# Patient Record
Sex: MALE | Race: WHITE | NOT HISPANIC OR LATINO | Employment: UNEMPLOYED | RURAL
[De-identification: names, ages, dates, MRNs, and addresses within clinical notes are randomized per-mention and may not be internally consistent; named-entity substitution may affect disease eponyms.]

---

## 2018-10-13 ENCOUNTER — OFFICE VISIT (OUTPATIENT)
Dept: RETAIL CLINIC | Facility: CLINIC | Age: 68
End: 2018-10-13

## 2018-10-13 VITALS
DIASTOLIC BLOOD PRESSURE: 84 MMHG | HEART RATE: 63 BPM | HEIGHT: 72 IN | OXYGEN SATURATION: 98 % | BODY MASS INDEX: 32.67 KG/M2 | SYSTOLIC BLOOD PRESSURE: 148 MMHG | RESPIRATION RATE: 18 BRPM | WEIGHT: 241.2 LBS | TEMPERATURE: 98.3 F

## 2018-10-13 DIAGNOSIS — M54.41 ACUTE RIGHT-SIDED LOW BACK PAIN WITH BILATERAL SCIATICA: Primary | ICD-10-CM

## 2018-10-13 DIAGNOSIS — M54.42 ACUTE RIGHT-SIDED LOW BACK PAIN WITH BILATERAL SCIATICA: Primary | ICD-10-CM

## 2018-10-13 PROCEDURE — 96372 THER/PROPH/DIAG INJ SC/IM: CPT | Performed by: NURSE PRACTITIONER

## 2018-10-13 PROCEDURE — 99203 OFFICE O/P NEW LOW 30 MIN: CPT | Performed by: NURSE PRACTITIONER

## 2018-10-13 RX ORDER — LEVOTHYROXINE SODIUM 0.1 MG/1
100 TABLET ORAL DAILY
COMMUNITY

## 2018-10-13 RX ORDER — METHYLPREDNISOLONE ACETATE 80 MG/ML
80 INJECTION, SUSPENSION INTRA-ARTICULAR; INTRALESIONAL; INTRAMUSCULAR; SOFT TISSUE ONCE
Status: COMPLETED | OUTPATIENT
Start: 2018-10-13 | End: 2018-10-13

## 2018-10-13 RX ADMIN — METHYLPREDNISOLONE ACETATE 80 MG: 80 INJECTION, SUSPENSION INTRA-ARTICULAR; INTRALESIONAL; INTRAMUSCULAR; SOFT TISSUE at 09:58

## 2018-10-13 NOTE — PROGRESS NOTES
"Subjective   Vijay Vasquez is a 68 y.o. male.   /84   Pulse 63   Temp 98.3 °F (36.8 °C) (Temporal Artery )   Resp 18   Ht 182.9 cm (72\")   Wt 109 kg (241 lb 3.2 oz)   SpO2 98%   BMI 32.71 kg/m²   No Known Allergies  Past Medical History:   Diagnosis Date   • Acid reflux    • Back pain    • Bulging lumbar disc    • Cancer (CMS/HCC)    • Disease of thyroid gland          Back Pain        Pt present to clinic for back pain starting a week ago after weed eating for 3 hours. This occurs around 2x a year and was completely immobile for a few days after. He has been seen by his cyropractor which has helped significantly but still in moderate pain and cannot move, walk fast. In the past prednisone has been helpful.     The following portions of the patient's history were reviewed and updated as appropriate: allergies, current medications, past family history, past medical history, past social history, past surgical history and problem list.    Review of Systems   Constitutional: Negative.    HENT: Negative.    Eyes: Negative.    Respiratory: Negative.    Cardiovascular: Negative.    Gastrointestinal: Negative.    Endocrine: Negative.    Genitourinary: Negative.    Musculoskeletal: Positive for back pain.   Skin: Negative.        Objective   Physical Exam   Constitutional: He appears well-developed and well-nourished.   HENT:   Head: Normocephalic and atraumatic.   Cardiovascular: Regular rhythm and normal heart sounds.    Pulmonary/Chest: Effort normal. He has no wheezes. He has no rhonchi. He has no rales.   Musculoskeletal:        Lumbar back: He exhibits decreased range of motion, tenderness and pain.   Pt leaning slightly right with a choppy gait while walking.    Lymphadenopathy:     He has no cervical adenopathy.   Skin: Skin is warm and dry.       Assessment/Plan   Vijay was seen today for back pain.    Diagnoses and all orders for this visit:    Acute right-sided low back pain with bilateral " sciatica  -     methylPREDNISolone acetate (DEPO-medrol) injection 80 mg; Inject 1 mL into the appropriate muscle as directed by prescriber 1 (One) Time.        Start ibuprofen 400mg every 6 hours while awake for the next 3 days then decrease to as needed.     Pt advised that if symptoms worsen or do not improve seek additional care from PCP.

## 2018-10-13 NOTE — PATIENT INSTRUCTIONS
Sciatica  Sciatica is pain, numbness, weakness, or tingling along the path of the sciatic nerve. The sciatic nerve starts in the lower back and runs down the back of each leg. The nerve controls the muscles in the lower leg and in the back of the knee. It also provides feeling (sensation) to the back of the thigh, the lower leg, and the sole of the foot. Sciatica is a symptom of another medical condition that pinches or puts pressure on the sciatic nerve.  Generally, sciatica only affects one side of the body. Sciatica usually goes away on its own or with treatment. In some cases, sciatica may keep coming back (recur).  What are the causes?  This condition is caused by pressure on the sciatic nerve, or pinching of the sciatic nerve. This may be the result of:  · A disk in between the bones of the spine (vertebrae) bulging out too far (herniated disk).  · Age-related changes in the spinal disks (degenerative disk disease).  · A pain disorder that affects a muscle in the buttock (piriformis syndrome).  · Extra bone growth (bone spur) near the sciatic nerve.  · An injury or break (fracture) of the pelvis.  · Pregnancy.  · Tumor (rare).    What increases the risk?  The following factors may make you more likely to develop this condition:  · Playing sports that place pressure or stress on the spine, such as football or weight lifting.  · Having poor strength and flexibility.  · A history of back injury.  · A history of back surgery.  · Sitting for long periods of time.  · Doing activities that involve repetitive bending or lifting.  · Obesity.    What are the signs or symptoms?  Symptoms can vary from mild to very severe, and they may include:  · Any of these problems in the lower back, leg, hip, or buttock:  ? Mild tingling or dull aches.  ? Burning sensations.  ? Sharp pains.  · Numbness in the back of the calf or the sole of the foot.  · Leg weakness.  · Severe back pain that makes movement difficult.    These  symptoms may get worse when you cough, sneeze, or laugh, or when you sit or stand for long periods of time. Being overweight may also make symptoms worse. In some cases, symptoms may recur over time.  How is this diagnosed?  This condition may be diagnosed based on:  · Your symptoms.  · A physical exam. Your health care provider may ask you to do certain movements to check whether those movements trigger your symptoms.  · You may have tests, including:  ? Blood tests.  ? X-rays.  ? MRI.  ? CT scan.    How is this treated?  In many cases, this condition improves on its own, without any treatment. However, treatment may include:  · Reducing or modifying physical activity during periods of pain.  · Exercising and stretching to strengthen your abdomen and improve the flexibility of your spine.  · Icing and applying heat to the affected area.  · Medicines that help:  ? To relieve pain and swelling.  ? To relax your muscles.  · Injections of medicines that help to relieve pain, irritation, and inflammation around the sciatic nerve (steroids).  · Surgery.    Follow these instructions at home:  Medicines  · Take over-the-counter and prescription medicines only as told by your health care provider.  · Do not drive or operate heavy machinery while taking prescription pain medicine.  Managing pain  · If directed, apply ice to the affected area.  ? Put ice in a plastic bag.  ? Place a towel between your skin and the bag.  ? Leave the ice on for 20 minutes, 2-3 times a day.  · After icing, apply heat to the affected area before you exercise or as often as told by your health care provider. Use the heat source that your health care provider recommends, such as a moist heat pack or a heating pad.  ? Place a towel between your skin and the heat source.  ? Leave the heat on for 20-30 minutes.  ? Remove the heat if your skin turns bright red. This is especially important if you are unable to feel pain, heat, or cold. You may have a  greater risk of getting burned.  Activity  · Return to your normal activities as told by your health care provider. Ask your health care provider what activities are safe for you.  ? Avoid activities that make your symptoms worse.  · Take brief periods of rest throughout the day. Resting in a lying or standing position is usually better than sitting to rest.  ? When you rest for longer periods, mix in some mild activity or stretching between periods of rest. This will help to prevent stiffness and pain.  ? Avoid sitting for long periods of time without moving. Get up and move around at least one time each hour.  · Exercise and stretch regularly, as told by your health care provider.  · Do not lift anything that is heavier than 10 lb (4.5 kg) while you have symptoms of sciatica. When you do not have symptoms, you should still avoid heavy lifting, especially repetitive heavy lifting.  · When you lift objects, always use proper lifting technique, which includes:  ? Bending your knees.  ? Keeping the load close to your body.  ? Avoiding twisting.  General instructions  · Use good posture.  ? Avoid leaning forward while sitting.  ? Avoid hunching over while standing.  · Maintain a healthy weight. Excess weight puts extra stress on your back and makes it difficult to maintain good posture.  · Wear supportive, comfortable shoes. Avoid wearing high heels.  · Avoid sleeping on a mattress that is too soft or too hard. A mattress that is firm enough to support your back when you sleep may help to reduce your pain.  · Keep all follow-up visits as told by your health care provider. This is important.  Contact a health care provider if:  · You have pain that wakes you up when you are sleeping.  · You have pain that gets worse when you lie down.  · Your pain is worse than you have experienced in the past.  · Your pain lasts longer than 4 weeks.  · You experience unexplained weight loss.  Get help right away if:  · You lose control  of your bowel or bladder (incontinence).  · You have:  ? Weakness in your lower back, pelvis, buttocks, or legs that gets worse.  ? Redness or swelling of your back.  ? A burning sensation when you urinate.  This information is not intended to replace advice given to you by your health care provider. Make sure you discuss any questions you have with your health care provider.  Document Released: 2002 Document Revised: 2017 Document Reviewed: 2016  [employer] Interactive Patient Education © 2018 [employer]    Back Exercises  The following exercises strengthen the muscles that help to support the back. They also help to keep the lower back flexible. Doing these exercises can help to prevent back pain or lessen existing pain.  If you have back pain or discomfort, try doing these exercises 2-3 times each day or as told by your health care provider. When the pain goes away, do them once each day, but increase the number of times that you repeat the steps for each exercise (do more repetitions). If you do not have back pain or discomfort, do these exercises once each day or as told by your health care provider.  Exercises  Single Knee to Chest    Repeat these steps 3-5 times for each le. Lie on your back on a firm bed or the floor with your legs extended.  2. Bring one knee to your chest. Your other leg should stay extended and in contact with the floor.  3. Hold your knee in place by grabbing your knee or thigh.  4. Pull on your knee until you feel a gentle stretch in your lower back.  5. Hold the stretch for 10-30 seconds.  6. Slowly release and straighten your leg.    Pelvic Tilt    Repeat these steps 5-10 times:  1. Lie on your back on a firm bed or the floor with your legs extended.  2. Bend your knees so they are pointing toward the ceiling and your feet are flat on the floor.  3. Tighten your lower abdominal muscles to press your lower back against the floor. This motion will tilt your  pelvis so your tailbone points up toward the ceiling instead of pointing to your feet or the floor.  4. With gentle tension and even breathing, hold this position for 5-10 seconds.    Cat-Cow    Repeat these steps until your lower back becomes more flexible:  1. Get into a hands-and-knees position on a firm surface. Keep your hands under your shoulders, and keep your knees under your hips. You may place padding under your knees for comfort.  2. Let your head hang down, and point your tailbone toward the floor so your lower back becomes rounded like the back of a cat.  3. Hold this position for 5 seconds.  4. Slowly lift your head and point your tailbone up toward the ceiling so your back forms a sagging arch like the back of a cow.  5. Hold this position for 5 seconds.    Press-Ups    Repeat these steps 5-10 times:  1. Lie on your abdomen (face-down) on the floor.  2. Place your palms near your head, about shoulder-width apart.  3. While you keep your back as relaxed as possible and keep your hips on the floor, slowly straighten your arms to raise the top half of your body and lift your shoulders. Do not use your back muscles to raise your upper torso. You may adjust the placement of your hands to make yourself more comfortable.  4. Hold this position for 5 seconds while you keep your back relaxed.  5. Slowly return to lying flat on the floor.    Bridges    Repeat these steps 10 times:  1. Lie on your back on a firm surface.  2. Bend your knees so they are pointing toward the ceiling and your feet are flat on the floor.  3. Tighten your buttocks muscles and lift your buttocks off of the floor until your waist is at almost the same height as your knees. You should feel the muscles working in your buttocks and the back of your thighs. If you do not feel these muscles, slide your feet 1-2 inches farther away from your buttocks.  4. Hold this position for 3-5 seconds.  5. Slowly lower your hips to the starting position,  and allow your buttocks muscles to relax completely.    If this exercise is too easy, try doing it with your arms crossed over your chest.  Abdominal Crunches    Repeat these steps 5-10 times:  1. Lie on your back on a firm bed or the floor with your legs extended.  2. Bend your knees so they are pointing toward the ceiling and your feet are flat on the floor.  3. Cross your arms over your chest.  4. Tip your chin slightly toward your chest without bending your neck.  5. Tighten your abdominal muscles and slowly raise your trunk (torso) high enough to lift your shoulder blades a tiny bit off of the floor. Avoid raising your torso higher than that, because it can put too much stress on your low back and it does not help to strengthen your abdominal muscles.  6. Slowly return to your starting position.    Back Lifts  Repeat these steps 5-10 times:  1. Lie on your abdomen (face-down) with your arms at your sides, and rest your forehead on the floor.  2. Tighten the muscles in your legs and your buttocks.  3. Slowly lift your chest off of the floor while you keep your hips pressed to the floor. Keep the back of your head in line with the curve in your back. Your eyes should be looking at the floor.  4. Hold this position for 3-5 seconds.  5. Slowly return to your starting position.    Contact a health care provider if:  · Your back pain or discomfort gets much worse when you do an exercise.  · Your back pain or discomfort does not lessen within 2 hours after you exercise.  If you have any of these problems, stop doing these exercises right away. Do not do them again unless your health care provider says that you can.  Get help right away if:  · You develop sudden, severe back pain. If this happens, stop doing the exercises right away. Do not do them again unless your health care provider says that you can.  This information is not intended to replace advice given to you by your health care provider. Make sure you  discuss any questions you have with your health care provider.  Document Released: 01/25/2006 Document Revised: 04/26/2017 Document Reviewed: 02/11/2016  [employer] Interactive Patient Education © 2017 [employer]    Chronic Back Pain  When back pain lasts longer than 3 months, it is called chronic back pain. The cause of your back pain may not be known. Some common causes include:  · Wear and tear (degenerative disease) of the bones, ligaments, or disks in your back.  · Inflammation and stiffness in your back (arthritis).    People who have chronic back pain often go through certain periods in which the pain is more intense (flare-ups). Many people can learn to manage the pain with home care.  Follow these instructions at home:  Pay attention to any changes in your symptoms. Take these actions to help with your pain:  Activity  · Avoid bending and activities that make the problem worse.  · Do not sit or  one place for long periods of time.  · Take brief periods of rest throughout the day. This will reduce your pain. Resting in a lying or standing position is usually better than sitting to rest.  · When you are resting for longer periods, mix in some mild activity or stretching between periods of rest. This will help to prevent stiffness and pain.  · Get regular exercise. Ask your health care provider what activities are safe for you.  · Do not lift anything that is heavier than 10 lb (4.5 kg). Always use proper lifting technique, which includes:  ? Bending your knees.  ? Keeping the load close to your body.  ? Avoiding twisting.  Managing pain  · If directed, apply ice to the painful area. Your health care provider may recommend applying ice during the first 24-48 hours after a flare-up begins.  ? Put ice in a plastic bag.  ? Place a towel between your skin and the bag.  ? Leave the ice on for 20 minutes, 2-3 times per day.  · After icing, apply heat to the affected area as often as told by your health care  provider. Use the heat source that your health care provider recommends, such as a moist heat pack or a heating pad.  ? Place a towel between your skin and the heat source.  ? Leave the heat on for 20-30 minutes.  ? Remove the heat if your skin turns bright red. This is especially important if you are unable to feel pain, heat, or cold. You may have a greater risk of getting burned.  · Try soaking in a warm tub.  · Take over-the-counter and prescription medicines only as told by your health care provider.  · Keep all follow-up visits as told by your health care provider. This is important.  Contact a health care provider if:  · You have pain that is not relieved with rest or medicine.  Get help right away if:  · You have weakness or numbness in one or both of your legs or feet.  · You have trouble controlling your bladder or your bowels.  · You have nausea or vomiting.  · You have pain in your abdomen.  · You have shortness of breath or you faint.  This information is not intended to replace advice given to you by your health care provider. Make sure you discuss any questions you have with your health care provider.  Document Released: 01/25/2006 Document Revised: 04/27/2017 Document Reviewed: 06/06/2016  [employer] Interactive Patient Education © 2018 [employer]